# Patient Record
Sex: FEMALE | Race: WHITE | HISPANIC OR LATINO | Employment: UNEMPLOYED | URBAN - METROPOLITAN AREA
[De-identification: names, ages, dates, MRNs, and addresses within clinical notes are randomized per-mention and may not be internally consistent; named-entity substitution may affect disease eponyms.]

---

## 2022-01-11 ENCOUNTER — OFFICE VISIT (OUTPATIENT)
Dept: ORTHOPEDICS | Facility: CLINIC | Age: 47
End: 2022-01-11
Payer: MEDICAID

## 2022-01-11 ENCOUNTER — HOSPITAL ENCOUNTER (OUTPATIENT)
Dept: RADIOLOGY | Facility: HOSPITAL | Age: 47
Discharge: HOME | End: 2022-01-11
Attending: ORTHOPAEDIC SURGERY
Payer: MEDICAID

## 2022-01-11 ENCOUNTER — TELEPHONE (OUTPATIENT)
Dept: ORTHOPEDICS | Facility: CLINIC | Age: 47
End: 2022-01-11
Payer: MEDICAID

## 2022-01-11 VITALS
HEIGHT: 66 IN | BODY MASS INDEX: 28.54 KG/M2 | DIASTOLIC BLOOD PRESSURE: 79 MMHG | WEIGHT: 177.56 LBS | HEART RATE: 139 BPM | SYSTOLIC BLOOD PRESSURE: 110 MMHG

## 2022-01-11 DIAGNOSIS — M25.511 RIGHT SHOULDER PAIN, UNSPECIFIED CHRONICITY: Primary | ICD-10-CM

## 2022-01-11 DIAGNOSIS — S49.91XA RIGHT SHOULDER INJURY, INITIAL ENCOUNTER: Primary | ICD-10-CM

## 2022-01-11 DIAGNOSIS — M25.511 RIGHT SHOULDER PAIN, UNSPECIFIED CHRONICITY: ICD-10-CM

## 2022-01-11 PROCEDURE — 1159F MED LIST DOCD IN RCRD: CPT | Mod: CPTII,,, | Performed by: ORTHOPAEDIC SURGERY

## 2022-01-11 PROCEDURE — 99203 OFFICE O/P NEW LOW 30 MIN: CPT | Mod: PBBFAC,PN | Performed by: ORTHOPAEDIC SURGERY

## 2022-01-11 PROCEDURE — 1159F PR MEDICATION LIST DOCUMENTED IN MEDICAL RECORD: ICD-10-PCS | Mod: CPTII,,, | Performed by: ORTHOPAEDIC SURGERY

## 2022-01-11 PROCEDURE — 1160F PR REVIEW ALL MEDS BY PRESCRIBER/CLIN PHARMACIST DOCUMENTED: ICD-10-PCS | Mod: CPTII,,, | Performed by: ORTHOPAEDIC SURGERY

## 2022-01-11 PROCEDURE — 73030 X-RAY EXAM OF SHOULDER: CPT | Mod: 26,RT,, | Performed by: RADIOLOGY

## 2022-01-11 PROCEDURE — 3078F DIAST BP <80 MM HG: CPT | Mod: CPTII,,, | Performed by: ORTHOPAEDIC SURGERY

## 2022-01-11 PROCEDURE — 99204 PR OFFICE/OUTPT VISIT, NEW, LEVL IV, 45-59 MIN: ICD-10-PCS | Mod: S$PBB,,, | Performed by: ORTHOPAEDIC SURGERY

## 2022-01-11 PROCEDURE — 99999 PR PBB SHADOW E&M-NEW PATIENT-LVL III: CPT | Mod: PBBFAC,,, | Performed by: ORTHOPAEDIC SURGERY

## 2022-01-11 PROCEDURE — 3008F PR BODY MASS INDEX (BMI) DOCUMENTED: ICD-10-PCS | Mod: CPTII,,, | Performed by: ORTHOPAEDIC SURGERY

## 2022-01-11 PROCEDURE — 3074F PR MOST RECENT SYSTOLIC BLOOD PRESSURE < 130 MM HG: ICD-10-PCS | Mod: CPTII,,, | Performed by: ORTHOPAEDIC SURGERY

## 2022-01-11 PROCEDURE — 99204 OFFICE O/P NEW MOD 45 MIN: CPT | Mod: S$PBB,,, | Performed by: ORTHOPAEDIC SURGERY

## 2022-01-11 PROCEDURE — 3074F SYST BP LT 130 MM HG: CPT | Mod: CPTII,,, | Performed by: ORTHOPAEDIC SURGERY

## 2022-01-11 PROCEDURE — 3008F BODY MASS INDEX DOCD: CPT | Mod: CPTII,,, | Performed by: ORTHOPAEDIC SURGERY

## 2022-01-11 PROCEDURE — 3078F PR MOST RECENT DIASTOLIC BLOOD PRESSURE < 80 MM HG: ICD-10-PCS | Mod: CPTII,,, | Performed by: ORTHOPAEDIC SURGERY

## 2022-01-11 PROCEDURE — 73030 XR SHOULDER COMPLETE 2 OR MORE VIEWS RIGHT: ICD-10-PCS | Mod: 26,RT,, | Performed by: RADIOLOGY

## 2022-01-11 PROCEDURE — 1160F RVW MEDS BY RX/DR IN RCRD: CPT | Mod: CPTII,,, | Performed by: ORTHOPAEDIC SURGERY

## 2022-01-11 PROCEDURE — 99999 PR PBB SHADOW E&M-NEW PATIENT-LVL III: ICD-10-PCS | Mod: PBBFAC,,, | Performed by: ORTHOPAEDIC SURGERY

## 2022-01-11 PROCEDURE — 73030 X-RAY EXAM OF SHOULDER: CPT | Mod: TC,FY,RT

## 2022-01-11 RX ORDER — ALUMINUM HYDROXIDE, MAGNESIUM HYDROXIDE, SIMETHICONE 800; 800; 80 MG/10ML; MG/10ML; MG/10ML
SUSPENSION ORAL
COMMUNITY
Start: 2021-11-30

## 2022-01-11 RX ORDER — ESTRADIOL 1 MG/1
1 TABLET ORAL DAILY
COMMUNITY
Start: 2021-12-13

## 2022-01-11 RX ORDER — PROMETHAZINE HYDROCHLORIDE 25 MG/1
TABLET ORAL
COMMUNITY
Start: 2022-01-05

## 2022-01-11 RX ORDER — OLANZAPINE 10 MG/1
10 TABLET ORAL NIGHTLY
COMMUNITY
Start: 2021-12-30

## 2022-01-11 RX ORDER — HYDROCODONE BITARTRATE AND ACETAMINOPHEN 5; 325 MG/1; MG/1
1 TABLET ORAL 3 TIMES DAILY PRN
COMMUNITY
Start: 2021-12-17

## 2022-01-11 RX ORDER — ZOLPIDEM TARTRATE 10 MG/1
10 TABLET ORAL NIGHTLY PRN
COMMUNITY
Start: 2021-12-30

## 2022-01-11 RX ORDER — BUTALBITAL, ACETAMINOPHEN AND CAFFEINE 50; 325; 40 MG/1; MG/1; MG/1
1 TABLET ORAL 2 TIMES DAILY PRN
COMMUNITY
Start: 2021-12-17

## 2022-01-11 RX ORDER — ROSUVASTATIN CALCIUM 20 MG/1
20 TABLET, COATED ORAL DAILY
COMMUNITY
Start: 2021-12-13

## 2022-01-11 RX ORDER — SUMATRIPTAN SUCCINATE 25 MG/1
TABLET ORAL
COMMUNITY
Start: 2021-12-29

## 2022-01-11 RX ORDER — LIDOCAINE HYDROCHLORIDE 20 MG/ML
SOLUTION ORAL; TOPICAL
COMMUNITY
Start: 2021-11-30

## 2022-01-11 RX ORDER — FAMOTIDINE 20 MG/1
20 TABLET, FILM COATED ORAL DAILY
COMMUNITY
Start: 2021-12-25

## 2022-01-11 RX ORDER — HYOSCYAMINE SULFATE 0.12 MG/1
TABLET SUBLINGUAL
COMMUNITY
Start: 2021-12-26

## 2022-01-11 RX ORDER — CLONIDINE HYDROCHLORIDE 0.1 MG/1
0.1 TABLET ORAL NIGHTLY
COMMUNITY
Start: 2021-12-30

## 2022-01-11 RX ORDER — TRAZODONE HYDROCHLORIDE 300 MG/1
300 TABLET ORAL NIGHTLY
COMMUNITY
Start: 2021-12-30

## 2022-01-11 RX ORDER — QUETIAPINE FUMARATE 400 MG/1
400 TABLET, FILM COATED ORAL NIGHTLY
COMMUNITY
Start: 2021-12-30

## 2022-01-11 RX ORDER — CETIRIZINE HYDROCHLORIDE 10 MG/1
10 TABLET ORAL DAILY PRN
COMMUNITY
Start: 2021-08-17

## 2022-01-11 RX ORDER — TIZANIDINE 4 MG/1
4 TABLET ORAL 2 TIMES DAILY
COMMUNITY
Start: 2021-12-17

## 2022-01-11 RX ORDER — CETIRIZINE HYDROCHLORIDE, PSEUDOEPHEDRINE HYDROCHLORIDE 5; 120 MG/1; MG/1
1 TABLET, FILM COATED, EXTENDED RELEASE ORAL DAILY
COMMUNITY
Start: 2021-11-01

## 2022-01-11 RX ORDER — PANTOPRAZOLE SODIUM 40 MG/1
40 TABLET, DELAYED RELEASE ORAL DAILY
COMMUNITY
Start: 2022-01-03

## 2022-01-11 NOTE — PROGRESS NOTES
Patient ID:   Areli Buitrago is a 46 y.o. female.    Chief Complaint:   Right shoulder pain    HPI:   The patient is a 46 year old female RHD who reports injuring the right shoulder in early November 2021. She reports that the pain started when she was doing a significant amount of lifting and taking cere of her mother. The pain is in the supraclavicular and infraclavicular region. She denies feeling a pop when the injury occurred. Pain intensity is 8/10. Treatment has included muscle relaxants and Ambien at night    Medications:    Current Outpatient Medications:     ANTACID ANTI--400-40 mg/5 mL suspension, Take by mouth., Disp: , Rfl:     buPROPion (WELLBUTRIN) 100 MG tablet, Take 1 tablet (100 mg total) by mouth once daily., Disp: 30 tablet, Rfl: 2    butalbital-acetaminophen-caffeine -40 mg (FIORICET, ESGIC) -40 mg per tablet, Take 1 tablet by mouth 2 (two) times daily as needed., Disp: , Rfl:     cetirizine (ZYRTEC) 10 MG tablet, Take 10 mg by mouth daily as needed., Disp: , Rfl:     cetirizine-pseudoephedrine 5-120 mg Tb12, Take 1 tablet by mouth once daily., Disp: , Rfl:     cloNIDine (CATAPRES) 0.1 MG tablet, Take 0.1 mg by mouth nightly., Disp: , Rfl:     dicyclomine (BENTYL) 20 mg tablet, Take 1 tablet (20 mg total) by mouth 3 (three) times daily., Disp: 90 tablet, Rfl: 1    estradioL (ESTRACE) 1 MG tablet, Take 1 mg by mouth once daily., Disp: , Rfl:     famotidine (PEPCID) 20 MG tablet, Take 20 mg by mouth once daily., Disp: , Rfl:     HYDROcodone-acetaminophen (NORCO) 5-325 mg per tablet, Take 1 tablet by mouth 3 (three) times daily as needed., Disp: , Rfl:     hyoscyamine (LEVSIN/SL) 0.125 mg Subl, Place under the tongue., Disp: , Rfl:     levothyroxine (SYNTHROID) 50 MCG tablet, Take 1 tablet (50 mcg total) by mouth before breakfast. (Patient taking differently: Take 75 mcg by mouth before breakfast.), Disp: 30 tablet, Rfl: 1    LIDOCAINE VISCOUS 2 % solution, , Disp: ,  Rfl:     OLANZapine (ZYPREXA) 10 MG tablet, Take 10 mg by mouth nightly., Disp: , Rfl:     pantoprazole (PROTONIX) 40 MG tablet, Take 40 mg by mouth once daily., Disp: , Rfl:     prochlorperazine (COMPAZINE) 5 MG tablet, Take 1 tablet (5 mg total) by mouth 3 (three) times daily as needed., Disp: 90 tablet, Rfl: 0    promethazine (PHENERGAN) 25 MG tablet, Take by mouth., Disp: , Rfl:     QUEtiapine (SEROQUEL) 400 MG tablet, Take 400 mg by mouth nightly., Disp: , Rfl:     rosuvastatin (CRESTOR) 20 MG tablet, Take 20 mg by mouth once daily., Disp: , Rfl:     sertraline (ZOLOFT) 25 MG tablet, Take 1 tablet (25 mg total) by mouth once daily., Disp: 30 tablet, Rfl: 1    sumatriptan (IMITREX) 25 MG Tab, Take by mouth., Disp: , Rfl:     tiZANidine (ZANAFLEX) 4 MG tablet, Take 4 mg by mouth 2 (two) times daily., Disp: , Rfl:     traZODone (DESYREL) 300 MG tablet, Take 300 mg by mouth nightly., Disp: , Rfl:     zolpidem (AMBIEN) 10 mg Tab, Take 10 mg by mouth nightly as needed., Disp: , Rfl:     Allergies:  Review of patient's allergies indicates:   Allergen Reactions    Adhesive     Codeine     Latex, natural rubber     Zofran [ondansetron hcl (pf)]        Past Medical History:  Past Medical History:   Diagnosis Date    Adrenal insufficiency     Asthma     Gastroparesis     Generalized anxiety disorder     History of bladder problems     Hypothyroidism     PTSD (post-traumatic stress disorder)         Past Surgical History:  Past Surgical History:   Procedure Laterality Date    HYSTERECTOMY      ROTATOR CUFF REPAIR         Social History:  Social History     Occupational History    Not on file   Tobacco Use    Smoking status: Never Smoker    Smokeless tobacco: Never Used   Substance and Sexual Activity    Alcohol use: No    Drug use: Yes     Types: Marijuana    Sexual activity: Not on file       Family History:  History reviewed. No pertinent family history.     ROS:  Review of Systems  "  Musculoskeletal: Positive for joint pain and myalgias.   All other systems reviewed and are negative.      Vitals:  /79   Pulse (!) 139   Ht 5' 6" (1.676 m)   Wt 80.6 kg (177 lb 9.3 oz)   BMI 28.66 kg/m²     Physical Examination:  Comprehensive Orthopaedic Musculoskeletal Exam    General        Constitutional: appears stated age, well-developed and well-nourished    Scleral icterus: no    Labored breathing: no    Psychiatric: normal mood and affect and no acute distress    Neurological: alert and oriented x3    Skin: intact    Lymphadenopathy: none     Ortho Exam   Cervical exam:  ROM within normal limits.   Negative Spurlings.     Right shoulder exam:  Tenderness in the following regions: supraclavicular, infraclavicular, AC joint, coracoid  No visible deformity.   ROM: active elevation 130, passive elevation 170, ER 30, IR T10  RTC strength: 4/5 elevation, 5/5 ER and IR.   Positive Neer. Positive Pepper.   Positive Cross Body Adduction  Positive Speeds.    Imaging:  I have ordered and independently reviewed the following imaging studies performed at [facility] today  Right shoulder XR series is unremarkable.     Assessment:  1. Right shoulder injury, initial encounter        Plan:  The patient injured the shoulder. She has a history of rotator cuff surgery in the past. MRI is recommended to assess the rotator cuff.    She asked about pain medication. I have declined. This should come from her PCP given history of gastroparesis, and drug overdose. She will be contacted with the MRI results.       Orders Placed This Encounter    MRI Shoulder Without Contrast Right     No follow-ups on file.        "